# Patient Record
Sex: FEMALE | Race: WHITE | NOT HISPANIC OR LATINO | Employment: FULL TIME | URBAN - METROPOLITAN AREA
[De-identification: names, ages, dates, MRNs, and addresses within clinical notes are randomized per-mention and may not be internally consistent; named-entity substitution may affect disease eponyms.]

---

## 2023-03-07 ENCOUNTER — HOSPITAL ENCOUNTER (EMERGENCY)
Facility: CLINIC | Age: 28
Discharge: HOME | End: 2023-03-08
Attending: EMERGENCY MEDICINE | Admitting: EMERGENCY MEDICINE
Payer: COMMERCIAL

## 2023-03-07 VITALS
TEMPERATURE: 97.4 F | WEIGHT: 125 LBS | BODY MASS INDEX: 22.15 KG/M2 | OXYGEN SATURATION: 100 % | DIASTOLIC BLOOD PRESSURE: 85 MMHG | HEART RATE: 88 BPM | HEIGHT: 63 IN | RESPIRATION RATE: 14 BRPM | SYSTOLIC BLOOD PRESSURE: 125 MMHG

## 2023-03-07 DIAGNOSIS — S16.1XXA STRAIN OF NECK MUSCLE, INITIAL ENCOUNTER: ICD-10-CM

## 2023-03-07 DIAGNOSIS — S22.000A COMPRESSION FRACTURE OF THORACIC VERTEBRA, INITIAL ENCOUNTER (H): ICD-10-CM

## 2023-03-07 DIAGNOSIS — S06.0X0A CONCUSSION WITHOUT LOSS OF CONSCIOUSNESS, INITIAL ENCOUNTER: ICD-10-CM

## 2023-03-07 PROCEDURE — 99285 EMERGENCY DEPT VISIT HI MDM: CPT | Mod: 25

## 2023-03-07 ASSESSMENT — ACTIVITIES OF DAILY LIVING (ADL): ADLS_ACUITY_SCORE: 35

## 2023-03-08 ENCOUNTER — APPOINTMENT (OUTPATIENT)
Dept: CT IMAGING | Facility: CLINIC | Age: 28
End: 2023-03-08
Attending: EMERGENCY MEDICINE
Payer: COMMERCIAL

## 2023-03-08 PROCEDURE — 70450 CT HEAD/BRAIN W/O DYE: CPT

## 2023-03-08 PROCEDURE — 70498 CT ANGIOGRAPHY NECK: CPT

## 2023-03-08 PROCEDURE — 250N000009 HC RX 250: Performed by: EMERGENCY MEDICINE

## 2023-03-08 PROCEDURE — 70496 CT ANGIOGRAPHY HEAD: CPT

## 2023-03-08 PROCEDURE — 250N000011 HC RX IP 250 OP 636: Performed by: EMERGENCY MEDICINE

## 2023-03-08 PROCEDURE — 258N000003 HC RX IP 258 OP 636: Performed by: EMERGENCY MEDICINE

## 2023-03-08 PROCEDURE — 96360 HYDRATION IV INFUSION INIT: CPT | Mod: 59

## 2023-03-08 PROCEDURE — 72125 CT NECK SPINE W/O DYE: CPT

## 2023-03-08 PROCEDURE — 72128 CT CHEST SPINE W/O DYE: CPT

## 2023-03-08 RX ORDER — CYCLOBENZAPRINE HCL 10 MG
10 TABLET ORAL 3 TIMES DAILY PRN
Qty: 20 TABLET | Refills: 0 | Status: SHIPPED | OUTPATIENT
Start: 2023-03-08 | End: 2023-03-15

## 2023-03-08 RX ORDER — IOPAMIDOL 755 MG/ML
75 INJECTION, SOLUTION INTRAVASCULAR ONCE
Status: COMPLETED | OUTPATIENT
Start: 2023-03-08 | End: 2023-03-08

## 2023-03-08 RX ADMIN — SODIUM CHLORIDE 1000 ML: 9 INJECTION, SOLUTION INTRAVENOUS at 00:19

## 2023-03-08 RX ADMIN — SODIUM CHLORIDE 90 ML: 900 INJECTION INTRAVENOUS at 00:36

## 2023-03-08 RX ADMIN — IOPAMIDOL 75 ML: 755 INJECTION, SOLUTION INTRAVENOUS at 00:35

## 2023-03-08 ASSESSMENT — ENCOUNTER SYMPTOMS
HEADACHES: 1
ARTHRALGIAS: 1
VOMITING: 1
NUMBNESS: 0
WOUND: 1
ROS GI COMMENTS: (-) INCONTINENCE
DIARRHEA: 0
WEAKNESS: 0
NAUSEA: 1

## 2023-03-08 ASSESSMENT — ACTIVITIES OF DAILY LIVING (ADL)
ADLS_ACUITY_SCORE: 35
ADLS_ACUITY_SCORE: 35

## 2023-03-08 NOTE — ED TRIAGE NOTES
Pt reports she fell down stairs on Friday and was not seen after fall. Pt denies LOC. Pt reports feeling sore. On Sunday Pt started getting a headache. Pt flew to [place] yesterday and reports feeling nauseous so flew back to come to ED.       Triage Assessment     Row Name 03/07/23 2036       Triage Assessment (Adult)    Airway WDL WDL       Respiratory WDL    Respiratory WDL WDL       Cardiac WDL    Cardiac WDL WDL       Cognitive/Neuro/Behavioral WDL    Cognitive/Neuro/Behavioral WDL WDL

## 2023-03-08 NOTE — ED PROVIDER NOTES
History     Chief Complaint:  Headache and Fall     The history is provided by the patient.      Kayy Shea is a 27 year old female who presents with a persisting headache for the last 4 days. The patient reports that she was walking down her wooden staircase while wearing socks when she slipped and fell, landing on her right side and sustaining a bruise on her right buttock along with some tailbone pain. Since then, she has had a constant headache, which only improves when laying down, leading her to believe that she also hit her neck during the fall, without loss of consciousness. She has tried taking Tylenol and ibuprofen with no alleviation and has had associated nausea with one episode of emesis last night. Of note, the patient left for a vacation to [place] last night, but returned today to be seen at the ED due to the severity of her pain. Presently, she denies any numbness or weakness in her extremities and has had no diarrhea or incontinence. She does note having a history of headaches related to chronic back pain but has never been diagnosed with migraines and states that her current pain is not similar. She is not on blood thinners and denies any chance of pregnancy as she has an IUD in place.    Independent Historian:   None - Patient Only    Review of External Notes: none    ROS:  Review of Systems   Gastrointestinal: Positive for nausea and vomiting. Negative for diarrhea.        (-) incontinence    Genitourinary:        (-) incontinence    Musculoskeletal: Positive for arthralgias.   Skin: Positive for wound (bruise).   Neurological: Positive for headaches. Negative for weakness and numbness.   All other systems reviewed and are negative.    Allergies:  Influenza Vaccines     Medications:    Mirena IUD in place    Past Medical History:    ADHD  Depression  Anxiety  Seizures  Hypermetropia    Past Surgical History:    Siloam tetth extraction    Family History:    Type 2 diabetes mellitus  "  Urolithiasis    Social History:  The patient presents to the ED with family members.  The patient arrived to the ED in a private vehicle.  PCP: Belgica Harrell     Physical Exam     Patient Vitals for the past 24 hrs:   BP Temp Temp src Pulse Resp SpO2 Height Weight   03/07/23 2035 125/85 97.4  F (36.3  C) Temporal 88 14 100 % 1.6 m (5' 3\") 56.7 kg (125 lb)      Physical Exam  General: Patient is alert and normal appearing.  HEENT: Head atraumatic    Eyes: pupils equal and reactive. Conjunctiva clear   Nares: patent   Oropharynx: no lesions, uvula midline, no palatal draping, normal voice, no trismus  Neck: Supple without lymphadenopathy, no meningismus  Chest: Heart regular rate and rhythm.   Lungs: Equal clear to auscultation with no wheeze or rales  Abdomen: Soft, non tender, nondistended, normal bowel sounds  Back: No costovertebral angle tenderness, no midline C, T or L spine tenderness.  Patient with right trapezius tenderness to palpation.  Neuro: Grossly nonfocal, normal speech, strength equal bilaterally, CN 2-12 intact  Extremities: No deformities, equal radial and DP pulses. No clubbing, cyanosis.  No edema  Skin: Warm and dry with no rash.       Emergency Department Course     Imaging:  CT Thoracic Spine w/o Contrast   Final Result   IMPRESSION:   1.  Age-indeterminate mild to moderate compression deformities of T3-T10. MRI could clarify the age of these compression deformities.   2.  No high-grade spinal canal or neural foraminal stenosis.         Cervical spine CT w/o contrast   Final Result    IMPRESSION:   1.  T1 vertebral body superior endplate subtle cortical buckling with minimal compression deformity. Findings concerning for acute or subacute fracture.      2.  T3 vertebral body superior endplate mild compression deformity, age-indeterminate.      3.  T4 vertebral body superior endplate partially visualized with a compression deformity, suspect acute or subacute.       4.  Consider " dedicated CT thoracic spine.      5.  Cervical spine demonstrates no acute fracture.         CTA Head Neck with Contrast   Final Result    IMPRESSION:    HEAD CTA:   No significant stenosis/occlusion.      NECK CTA:   1.  No significant stenosis, occlusion, dissection.      2.  No blunt cerebral vascular injury.      3.  T3, T4, T5, T6 vertebral bodies demonstrate mild to moderate compression deformities. Consider dedicated CT thoracic spine.      4.  Right laryngeal ventricle is asymmetrically larger than the left. Please correlate for right vocal cord paralysis.         CT Head w/o Contrast   Final Result   IMPRESSION:   1.  No acute intracranial process.            Report per radiology      Emergency Department Course & Assessments:  Interventions:  Medications   0.9% sodium chloride BOLUS (0 mLs Intravenous Stopped 3/8/23 0126)   iopamidol (ISOVUE-370) solution 75 mL (75 mLs Intravenous $Given 3/8/23 0035)   Saline (90 mLs Intravenous $Given 3/8/23 0036)      Assessments:  2350: I performed an exam of the patient and obtained history, as documented above.   0211 I reevaluated the patient  0314 I reevaluated the patient    Independent Interpretation (X-rays, CTs, rhythm strip):  none    Consultations/Discussion of Management or Tests:  none    Social Determinants of Health affecting care:   none    Disposition:  The patient was discharged to home.     Impression & Plan    CMS Diagnoses: None      Medical Decision Making:  Kayy Shea is a 27 year old female who presents for evaluation after fall downstairs 4 days ago with trauma to the head.  This patient has a history and clinical exam consistent with concussion.   The differential diagnosis includes skull fracture, concussion, epidural hematoma, subdural hematoma, intracerebral hemorrhage, and traumatic subarachnoid hemorrhage;  CT imaging is reassuring.  CTA was obtained given that she felt she directly hit her neck and was continue to have neck pain.   There is no evidence of dissection.  Cervical spine x-ray without acute fracture subluxation.  Neck was then cleared by [facility] and do not feel she needs MRI imaging.  Thoracic vertebrae did demonstrate compression fractures and dedicated thoracic spine CT was obtained.  Compression fractures of T3-T10 with the greatest at T6 that shows 40%.  Patient has no tenderness over the thoracic vertebrae.  She has a history of back injury in the past with heavy lifting and feels that this is likely when she had these compression fractures.  It is possible it is from this fall but again she has no pain at this time.  There is no radicular symptoms.  No loss of control of bowel or bladder.  Feel she can follow this up with spine surgery as an outpatient   Return to ED for red flags (change in behavior, drowsiness, seizures, vomiting, etc) and gave concussion precautions for home.  I did stress importance of avoiding a second concussion while brain heals.  The patients head to toe trauma exam is otherwise negative for serious underlying disease of the head, neck, chest, abdomen, extremities, pelvis.        Diagnosis:    ICD-10-CM    1. Compression fracture of thoracic vertebra, initial encounter (H)  S22.000A     T3-T10      2. Concussion without loss of consciousness, initial encounter  S06.0X0A       3. Strain of neck muscle, initial encounter  S16.1XXA            Discharge Medications:  Discharge Medication List as of 3/8/2023  3:27 AM      START taking these medications    Details   cyclobenzaprine (FLEXERIL) 10 MG tablet Take 1 tablet (10 mg) by mouth 3 times daily as needed for muscle spasms, Disp-20 tablet, R-0, Local Print            Scribe Disclosure:  JAZZMINE, Daphne Saldivar, am serving as a scribe at 1:54 AM on 3/8/2023 to document services personally performed by Keren Guillory MD based on my observations and the provider's statements to me.      3/7/2023   Keren Guillory MD Neuner, Maria Bea, MD  03/08/23  1627

## 2023-04-11 ENCOUNTER — TELEPHONE (OUTPATIENT)
Dept: NEUROSURGERY | Facility: CLINIC | Age: 28
End: 2023-04-11

## 2023-04-11 ENCOUNTER — OFFICE VISIT (OUTPATIENT)
Dept: NEUROSURGERY | Facility: CLINIC | Age: 28
End: 2023-04-11
Payer: COMMERCIAL

## 2023-04-11 VITALS — SYSTOLIC BLOOD PRESSURE: 125 MMHG | HEART RATE: 88 BPM | OXYGEN SATURATION: 100 % | DIASTOLIC BLOOD PRESSURE: 85 MMHG

## 2023-04-11 DIAGNOSIS — S22.030D COMPRESSION FRACTURE OF T3 VERTEBRA WITH ROUTINE HEALING, SUBSEQUENT ENCOUNTER: Primary | ICD-10-CM

## 2023-04-11 DIAGNOSIS — M54.50 ACUTE MIDLINE LOW BACK PAIN WITHOUT SCIATICA: ICD-10-CM

## 2023-04-11 PROCEDURE — 99204 OFFICE O/P NEW MOD 45 MIN: CPT | Performed by: PHYSICIAN ASSISTANT

## 2023-04-11 ASSESSMENT — PAIN SCALES - GENERAL: PAINLEVEL: MILD PAIN (3)

## 2023-04-11 NOTE — NURSING NOTE
"Kayy Shea is a 27 year old female who presents for:  Chief Complaint   Patient presents with     RECHECK     Compression fracture of thoracic vertebra        Initial Vitals:  /85   Pulse 88   SpO2 100%  Estimated body mass index is 22.14 kg/m  as calculated from the following:    Height as of 3/7/23: 5' 3\" (1.6 m).    Weight as of 3/7/23: 125 lb (56.7 kg).. There is no height or weight on file to calculate BSA. BP completed using cuff size: regular  Mild Pain (3)    Nursing Comments:     Aidan Molina MA    "

## 2023-04-11 NOTE — LETTER
4/11/2023         RE: Kayy Shea  [address] [place] [address]        Dear Colleague,    Thank you for referring your patient, Kayy Shea, to the [facility] NEUROLOG[facility]INICS [facility]. Please see a copy of my visit note below.    Dr. Spencer Hernandez  [facility]  Neurosurgery Clinic Visit      CC: Thoracic spine compression fracture follow-up    Primary care Provider: Belgica Harrell    Referring Provider: ED      Reason For Visit:   I was asked to consult on the patient for thoracic spine compression fractures.      HPI: Kayy Shea is a 27 year old female who presents for evaluation of her chief complaint of a recent presentation to the ED after a fall down a flight of about 6 steps.  Trauma work-up in the ER revealed compression fractures in the thoracic spine ranging from T3-T10.  She was noted to have approximately 40% vertebral body collapse at T6.  She had not been having any pain at that time, and has not been having any recent back pain.  She has had spinal pain intermittently over the last several years.  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  She has no back pain, or any radicular complaints at this time.  No focal weaknesses in the lower extremities.      No bowel or bladder changes, no problems with balance or coordination.      Past Medical History reviewed with patient during visit.    Past Surgical History reviewed with patient during visit.    No current outpatient medications on file.     No current facility-administered medications for this visit.       Allergies   Allergen Reactions     Influenza Vaccines Rash     fever       Social History     Socioeconomic History     Marital status: Single       No family history on file.       ROS: 10 point ROS neg other than the symptoms noted above in the HPI.    Vital Signs: /85   Pulse 88    SpO2 100%     Examination:  Constitutional:  Alert, well nourished, NAD.  HEENT: Normocephalic, atraumatic.   Pulmonary:  Without shortness of breath, normal effort.   Lymph: no lymphadenopathy to low back or LE.   Integumentary: Skin is free of rashes or lesions.   Cardiovascular:  No pitting edema of BLE.    Psych: Normal affect, no apparent distress    Neurological:  Awake  Alert  Oriented x 3  Speech clear  Cranial nerves II - XII grossly intact  Motor exam   Sensation normal to bilateral upper and lower extremities.    Reflexes are 2+ in the patellar and Achilles. There is no clonus. Downgoing Babinski.      Musculoskeletal:  Gait: Able to stand from a seated position. Normal non-antalgic, non-myelopathic gait.    Imaging:   EXAM: CT THORACIC SPINE W/O CONTRAST  LOCATION: [facility]  DATE/TIME: 3/8/2023 2:36 AM     INDICATION: pain after fall  COMPARISON: None.  TECHNIQUE: Routine CT Thoracic Spine without IV contrast. Multiplanar reformats. Dose reduction techniques were used.      FINDINGS:  VERTEBRA: There are mild to moderate compression deformities of T3-T10. These are age-indeterminate. These measure up to 40% loss of height at the T6 level. No fracture or posttraumatic subluxation.      CANAL/FORAMINA: No canal or neural foraminal stenosis.     PARASPINAL: No extraspinal abnormality.                                                                      IMPRESSION:  1.  Age-indeterminate mild to moderate compression deformities of T3-T10. MRI could clarify the age of these compression deformities.  2.  No high-grade spinal canal or neural foraminal stenosis.       Assessment/Plan:     Multiple thoracic compression fractures.    Kayy Shea is a 27 year old female.  I did have a discussion with the patient and her mother regarding her history and symptoms.  We reviewed her CT scan of her thoracic spine in detail.  At this point, we did discuss obtaining a lumbar and thoracic  MRI for hoping to clarify the age of these compression deformities.  We would also like her to get repeat thoracic x-rays in the next 2 to 4 weeks, and follow-up with us in the office.  She did wish to proceed with these options, and voiced agreement and understanding.          Damián LEGER [facility]surgery  [address] [address]    Tel [phone]      The use of Dragon/[employer] dictation services may have been used to construct the content in this note; any grammatical or spelling errors are non-intentional. Please contact the author of this note directly if you are in need of any clarification.        Again, thank you for allowing me to participate in the care of your patient.        Sincerely,        Damián Bruno PA-C

## 2023-04-11 NOTE — PROGRESS NOTES
Dr. Spencer Hernandez  [facility]  Neurosurgery Clinic Visit      CC: Thoracic spine compression fracture follow-up    Primary care Provider: Belgica Harrell    Referring Provider: ED      Reason For Visit:   I was asked to consult on the patient for thoracic spine compression fractures.      HPI: Kayy Shea is a 27 year old female who presents for evaluation of her chief complaint of a recent presentation to the ED after a fall down a flight of about 6 steps.  Trauma work-up in the ER revealed compression fractures in the thoracic spine ranging from T3-T10.  She was noted to have approximately 40% vertebral body collapse at T6.  She had not been having any pain at that time, and has not been having any recent back pain.  She has had spinal pain intermittently over the last several years.  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  She has no back pain, or any radicular complaints at this time.  No focal weaknesses in the lower extremities.      No bowel or bladder changes, no problems with balance or coordination.      Past Medical History reviewed with patient during visit.    Past Surgical History reviewed with patient during visit.    No current outpatient medications on file.     No current facility-administered medications for this visit.       Allergies   Allergen Reactions     Influenza Vaccines Rash     fever       Social History     Socioeconomic History     Marital status: Single       No family history on file.       ROS: 10 point ROS neg other than the symptoms noted above in the HPI.    Vital Signs: /85   Pulse 88   SpO2 100%     Examination:  Constitutional:  Alert, well nourished, NAD.  HEENT: Normocephalic, atraumatic.   Pulmonary:  Without shortness of breath, normal effort.   Lymph: no lymphadenopathy to low back or LE.   Integumentary: Skin is free of rashes  or lesions.   Cardiovascular:  No pitting edema of BLE.    Psych: Normal affect, no apparent distress    Neurological:  Awake  Alert  Oriented x 3  Speech clear  Cranial nerves II - XII grossly intact  Motor exam   Sensation normal to bilateral upper and lower extremities.    Reflexes are 2+ in the patellar and Achilles. There is no clonus. Downgoing Babinski.      Musculoskeletal:  Gait: Able to stand from a seated position. Normal non-antalgic, non-myelopathic gait.    Imaging:   EXAM: CT THORACIC SPINE W/O CONTRAST  LOCATION: [facility]  DATE/TIME: 3/8/2023 2:36 AM     INDICATION: pain after fall  COMPARISON: None.  TECHNIQUE: Routine CT Thoracic Spine without IV contrast. Multiplanar reformats. Dose reduction techniques were used.      FINDINGS:  VERTEBRA: There are mild to moderate compression deformities of T3-T10. These are age-indeterminate. These measure up to 40% loss of height at the T6 level. No fracture or posttraumatic subluxation.      CANAL/FORAMINA: No canal or neural foraminal stenosis.     PARASPINAL: No extraspinal abnormality.                                                                      IMPRESSION:  1.  Age-indeterminate mild to moderate compression deformities of T3-T10. MRI could clarify the age of these compression deformities.  2.  No high-grade spinal canal or neural foraminal stenosis.       Assessment/Plan:     Multiple thoracic compression fractures.    Kayy Shea is a 27 year old female.  I did have a discussion with the patient and her mother regarding her history and symptoms.  We reviewed her CT scan of her thoracic spine in detail.  At this point, we did discuss obtaining a lumbar and thoracic MRI for hoping to clarify the age of these compression deformities.  We would also like her to get repeat thoracic x-rays in the next 2 to 4 weeks, and follow-up with us in the office.  She did wish to proceed with these options, and voiced agreement and  understanding.          Damián Bruno PA-C  [facility]surgery  [address] [address]    Tel [phone]      The use of Dragon/[employer] dictation services may have been used to construct the content in this note; any grammatical or spelling errors are non-intentional. Please contact the author of this note directly if you are in need of any clarification.

## 2023-04-11 NOTE — TELEPHONE ENCOUNTER
Called patient to set up MRI appointments before her follow up with Damián on 5/16 I called and left a voice mail

## 2023-04-14 ENCOUNTER — TELEPHONE (OUTPATIENT)
Dept: NEUROSURGERY | Facility: CLINIC | Age: 28
End: 2023-04-14
Payer: COMMERCIAL

## 2023-04-14 NOTE — TELEPHONE ENCOUNTER
Called to give reminder to schedule MRI For Lumbar and Thoracic spine before next appointment with Damián in May. I left a voice mail.

## 2023-04-24 ENCOUNTER — ANCILLARY PROCEDURE (OUTPATIENT)
Dept: MRI IMAGING | Facility: CLINIC | Age: 28
End: 2023-04-24
Attending: PHYSICIAN ASSISTANT
Payer: COMMERCIAL

## 2023-04-24 DIAGNOSIS — M54.50 ACUTE MIDLINE LOW BACK PAIN WITHOUT SCIATICA: ICD-10-CM

## 2023-04-24 DIAGNOSIS — S22.030D COMPRESSION FRACTURE OF T3 VERTEBRA WITH ROUTINE HEALING, SUBSEQUENT ENCOUNTER: ICD-10-CM

## 2023-04-24 PROCEDURE — 72148 MRI LUMBAR SPINE W/O DYE: CPT

## 2023-04-24 PROCEDURE — 72146 MRI CHEST SPINE W/O DYE: CPT

## 2023-05-16 ENCOUNTER — ANCILLARY PROCEDURE (OUTPATIENT)
Dept: GENERAL RADIOLOGY | Facility: CLINIC | Age: 28
End: 2023-05-16
Attending: PHYSICIAN ASSISTANT
Payer: COMMERCIAL

## 2023-05-16 ENCOUNTER — OFFICE VISIT (OUTPATIENT)
Dept: NEUROSURGERY | Facility: CLINIC | Age: 28
End: 2023-05-16
Payer: COMMERCIAL

## 2023-05-16 VITALS — OXYGEN SATURATION: 100 % | HEART RATE: 123 BPM | DIASTOLIC BLOOD PRESSURE: 79 MMHG | SYSTOLIC BLOOD PRESSURE: 112 MMHG

## 2023-05-16 DIAGNOSIS — S22.030D COMPRESSION FRACTURE OF T3 VERTEBRA WITH ROUTINE HEALING, SUBSEQUENT ENCOUNTER: ICD-10-CM

## 2023-05-16 DIAGNOSIS — S22.030D COMPRESSION FRACTURE OF T3 VERTEBRA WITH ROUTINE HEALING, SUBSEQUENT ENCOUNTER: Primary | ICD-10-CM

## 2023-05-16 PROCEDURE — 99213 OFFICE O/P EST LOW 20 MIN: CPT | Performed by: PHYSICIAN ASSISTANT

## 2023-05-16 PROCEDURE — 72070 X-RAY EXAM THORAC SPINE 2VWS: CPT | Mod: TC | Performed by: RADIOLOGY

## 2023-05-16 RX ORDER — TRETINOIN 0.5 MG/G
CREAM TOPICAL
COMMUNITY
Start: 2023-03-28

## 2023-05-16 RX ORDER — PROPRANOLOL HYDROCHLORIDE 20 MG/1
TABLET ORAL
COMMUNITY
Start: 2023-04-21

## 2023-05-16 RX ORDER — DEXTROAMPHETAMINE SACCHARATE, AMPHETAMINE ASPARTATE MONOHYDRATE, DEXTROAMPHETAMINE SULFATE AND AMPHETAMINE SULFATE 5; 5; 5; 5 MG/1; MG/1; MG/1; MG/1
20 CAPSULE, EXTENDED RELEASE ORAL EVERY MORNING
COMMUNITY

## 2023-05-16 RX ORDER — SPIRONOLACTONE 25 MG/1
1 TABLET ORAL
COMMUNITY
Start: 2023-03-28

## 2023-05-16 ASSESSMENT — PAIN SCALES - GENERAL: PAINLEVEL: MODERATE PAIN (5)

## 2023-05-16 NOTE — PROGRESS NOTES
Neurosurgery Clinic  Neurosurgery followup:    HPI: Kayy Shea is a 27 year old female who presents for evaluation of her chief complaint of a recent presentation to the ED after a fall down a flight of about 6 steps.  Trauma work-up in the ER revealed compression fractures in the thoracic spine ranging from T3-T10.  She was noted to have approximately 40% vertebral body collapse at T6.  She had not been having any pain at that time, and has not been having any recent back pain.      She does have a new x-ray, and also had recent thoracic and lumbar MRIs.  We did review those in the office.  Please see reports for further details.      Exam:  Constitutional:  Alert, well nourished, NAD.  HEENT: Normocephalic, atraumatic.   Pulm:  Without shortness of breath   CV:  No pitting edema of BLE.      Neurological:  Awake  Alert  Oriented x 3  Motor exam:        IP Q DF PF EHL  R   5  5   5   5    5  L   5  5   5   5    5     Reflexes are 2+ in the patellar and Achilles. There is no clonus. Downgoing Babinski.      Able to spontaneously move L/E bilaterally  Sensation intact throughout all L/E dermatomes       Imaging: There is no additional collapse noted of her thoracic fractures.    A/P:    Multilevel thoracic compression fracture.    We did review all of her imaging.  She is not having any increase in her back or thoracic pain.  At this point, she will not need any further follow-up.  We did discuss getting her in with physical therapy to work on conditioning and strengthening of her spine and paraspinous musculature.  She did wish to proceed with that option.  She can follow-up with us on an as-needed basis.  She voiced agreement and understanding.          Damián Bruno PA-C  [facility]surgery  [address]  [address] [place] [id number]    Tel [phone]  Pager [phone]      The use of Dragon/[employer] dictation services may have been used to construct  the content in this note; any grammatical or spelling errors are non-intentional. Please contact the author of this note directly if you are in need of any clarification.

## 2023-05-16 NOTE — NURSING NOTE
"Kayy Shea is a 27 year old female who presents for:  Chief Complaint   Patient presents with     RECHECK        Initial Vitals:  /79   Pulse (!) 123   SpO2 100%  Estimated body mass index is 22.14 kg/m  as calculated from the following:    Height as of 3/7/23: 5' 3\" (1.6 m).    Weight as of 3/7/23: 125 lb (56.7 kg).. There is no height or weight on file to calculate BSA. BP completed using cuff size: regular  Moderate Pain (5)    Fritz Torres    "

## 2023-05-16 NOTE — LETTER
5/16/2023         RE: Kayy Shea  [address] [place] [address]        Dear Colleague,    Thank you for referring your patient, Kayy Shea, to the [facility] NEUROLOGY CLINICS [facility]. Please see a copy of my visit note below.    Neurosurgery Clinic  Neurosurgery followup:    HPI: Kayy Shea is a 27 year old female who presents for evaluation of her chief complaint of a recent presentation to the ED after a fall down a flight of about 6 steps.  Trauma work-up in the ER revealed compression fractures in the thoracic spine ranging from T3-T10.  She was noted to have approximately 40% vertebral body collapse at T6.  She had not been having any pain at that time, and has not been having any recent back pain.      She does have a new x-ray, and also had recent thoracic and lumbar MRIs.  We did review those in the office.  Please see reports for further details.      Exam:  Constitutional:  Alert, well nourished, NAD.  HEENT: Normocephalic, atraumatic.   Pulm:  Without shortness of breath   CV:  No pitting edema of BLE.      Neurological:  Awake  Alert  Oriented x 3  Motor exam:        IP Q DF PF EHL  R   5  5   5   5    5  L   5  5   5   5    5     Reflexes are 2+ in the patellar and Achilles. There is no clonus. Downgoing Babinski.      Able to spontaneously move L/E bilaterally  Sensation intact throughout all L/E dermatomes       Imaging: There is no additional collapse noted of her thoracic fractures.    A/P:    Multilevel thoracic compression fracture.    We did review all of her imaging.  She is not having any increase in her back or thoracic pain.  At this point, she will not need any further follow-up.  We did discuss getting her in with physical therapy to work on conditioning and strengthening of her spine and paraspinous musculature.  She did wish to proceed with that option.  She can follow-up with us on an as-needed basis.  She voiced agreement and understanding.          Damián  ROBERTO CARLOS Bruno PA-C  [facility] Neurosurgery  [address] [address]    Tel [phone]  Pager [phone]      The use of [email or web address] dictation services may have been used to construct the content in this note; any grammatical or spelling errors are non-intentional. Please contact the author of this note directly if you are in need of any clarification.        Again, thank you for allowing me to participate in the care of your patient.        Sincerely,        Damián Bruno PA-C

## 2023-05-21 ENCOUNTER — HEALTH MAINTENANCE LETTER (OUTPATIENT)
Age: 28
End: 2023-05-21

## 2024-07-28 ENCOUNTER — HEALTH MAINTENANCE LETTER (OUTPATIENT)
Age: 29
End: 2024-07-28

## 2025-08-10 ENCOUNTER — HEALTH MAINTENANCE LETTER (OUTPATIENT)
Age: 30
End: 2025-08-10